# Patient Record
Sex: MALE | Race: WHITE | HISPANIC OR LATINO | Employment: FULL TIME | URBAN - METROPOLITAN AREA
[De-identification: names, ages, dates, MRNs, and addresses within clinical notes are randomized per-mention and may not be internally consistent; named-entity substitution may affect disease eponyms.]

---

## 2023-06-23 LAB
ALANINE AMINOTRANSFERASE (SGPT) (U/L) IN SER/PLAS: 41 U/L (ref 10–52)
ALBUMIN (G/DL) IN SER/PLAS: 4.5 G/DL (ref 3.4–5)
ALBUMIN (MG/L) IN URINE: <7 MG/L
ALBUMIN/CREATININE (UG/MG) IN URINE: NORMAL UG/MG CRT (ref 0–30)
ALKALINE PHOSPHATASE (U/L) IN SER/PLAS: 42 U/L (ref 33–120)
ANION GAP IN SER/PLAS: 13 MMOL/L (ref 10–20)
ASPARTATE AMINOTRANSFERASE (SGOT) (U/L) IN SER/PLAS: 29 U/L (ref 9–39)
BILIRUBIN TOTAL (MG/DL) IN SER/PLAS: 0.6 MG/DL (ref 0–1.2)
CALCIUM (MG/DL) IN SER/PLAS: 9.3 MG/DL (ref 8.6–10.3)
CARBON DIOXIDE, TOTAL (MMOL/L) IN SER/PLAS: 26 MMOL/L (ref 21–32)
CHLORIDE (MMOL/L) IN SER/PLAS: 103 MMOL/L (ref 98–107)
CHOLESTEROL (MG/DL) IN SER/PLAS: 151 MG/DL (ref 0–199)
CHOLESTEROL IN HDL (MG/DL) IN SER/PLAS: 52.6 MG/DL
CHOLESTEROL/HDL RATIO: 2.9
CREATININE (MG/DL) IN SER/PLAS: 0.95 MG/DL (ref 0.5–1.3)
CREATININE (MG/DL) IN URINE: 57.4 MG/DL (ref 20–370)
FOLLITROPIN (IU/L) IN SER/PLAS: 17.6 IU/L
GFR MALE: >90 ML/MIN/1.73M2
GLUCOSE (MG/DL) IN SER/PLAS: 165 MG/DL (ref 74–99)
LUTEINIZING HORMONE (IU/ML) IN SER/PLAS: 8.7 IU/L
NON-HDL CHOLESTEROL: 98 MG/DL
POTASSIUM (MMOL/L) IN SER/PLAS: 3.9 MMOL/L (ref 3.5–5.3)
PROTEIN TOTAL: 7 G/DL (ref 6.4–8.2)
SODIUM (MMOL/L) IN SER/PLAS: 138 MMOL/L (ref 136–145)
THYROTROPIN (MIU/L) IN SER/PLAS BY DETECTION LIMIT <= 0.05 MIU/L: 1.03 MIU/L (ref 0.44–3.98)
UREA NITROGEN (MG/DL) IN SER/PLAS: 25 MG/DL (ref 6–23)

## 2023-06-26 LAB — SEX HORMONE BINDING GLOBULIN (NMOL/L) IN SER/PLAS: 31.1 NMOL/L (ref 13.3–89.5)

## 2023-06-29 LAB
TESTOSTERONE FREE (CHAN): 69.5 PG/ML (ref 35–155)
TESTOSTERONE,TOTAL,LC-MS/MS: 411 NG/DL (ref 250–1100)

## 2023-11-07 PROBLEM — M77.40 METATARSALGIA: Status: ACTIVE | Noted: 2023-11-07

## 2023-11-07 PROBLEM — S16.1XXA CERVICAL STRAIN: Status: ACTIVE | Noted: 2023-11-07

## 2023-11-07 PROBLEM — M62.838 MUSCLE SPASMS OF NECK: Status: ACTIVE | Noted: 2023-11-07

## 2023-11-07 PROBLEM — G47.33 OBSTRUCTIVE SLEEP APNEA SYNDROME: Status: ACTIVE | Noted: 2023-11-07

## 2023-11-07 PROBLEM — F43.20 ADJUSTMENT DISORDER: Status: ACTIVE | Noted: 2023-11-07

## 2023-11-07 PROBLEM — F41.8 PERFORMANCE ANXIETY: Status: ACTIVE | Noted: 2023-11-07

## 2023-11-07 PROBLEM — R68.82 DECREASED LIBIDO: Status: ACTIVE | Noted: 2023-11-07

## 2023-11-07 PROBLEM — M99.09 SEGMENTAL AND SOMATIC DYSFUNCTION: Status: ACTIVE | Noted: 2023-11-07

## 2023-11-07 PROBLEM — J30.9 ALLERGIC RHINITIS: Status: ACTIVE | Noted: 2023-11-07

## 2023-11-07 PROBLEM — M54.9 CHRONIC BACK PAIN: Status: ACTIVE | Noted: 2023-11-07

## 2023-11-07 PROBLEM — J02.9 ACUTE PHARYNGITIS: Status: ACTIVE | Noted: 2023-11-07

## 2023-11-07 PROBLEM — G47.19 EXCESSIVE DAYTIME SLEEPINESS: Status: ACTIVE | Noted: 2023-11-07

## 2023-11-07 PROBLEM — R09.81 NASAL CONGESTION WITH RHINORRHEA: Status: ACTIVE | Noted: 2023-11-07

## 2023-11-07 PROBLEM — M77.10 LATERAL EPICONDYLITIS: Status: ACTIVE | Noted: 2023-11-07

## 2023-11-07 PROBLEM — M17.9 OSTEOARTHRITIS OF KNEE: Status: ACTIVE | Noted: 2023-11-07

## 2023-11-07 PROBLEM — F51.02 INSOMNIA, TRANSIENT: Status: ACTIVE | Noted: 2023-11-07

## 2023-11-07 PROBLEM — E11.9 DIABETES MELLITUS (MULTI): Status: ACTIVE | Noted: 2023-11-07

## 2023-11-07 PROBLEM — J34.89 NASAL CONGESTION WITH RHINORRHEA: Status: ACTIVE | Noted: 2023-11-07

## 2023-11-07 PROBLEM — N52.9 ERECTILE DYSFUNCTION: Status: ACTIVE | Noted: 2023-11-07

## 2023-11-07 PROBLEM — E55.9 VITAMIN D DEFICIENCY: Status: ACTIVE | Noted: 2023-11-07

## 2023-11-07 PROBLEM — R10.32 LLQ DISCOMFORT: Status: ACTIVE | Noted: 2023-11-07

## 2023-11-07 PROBLEM — M16.9 OSTEOARTHRITIS OF HIP: Status: ACTIVE | Noted: 2023-11-07

## 2023-11-07 PROBLEM — M22.40 CHONDROMALACIA, PATELLA: Status: ACTIVE | Noted: 2023-11-07

## 2023-11-07 PROBLEM — M76.61 ACHILLES TENDINITIS OF RIGHT LOWER EXTREMITY: Status: ACTIVE | Noted: 2023-11-07

## 2023-11-07 PROBLEM — G89.29 CHRONIC BACK PAIN: Status: ACTIVE | Noted: 2023-11-07

## 2023-11-07 RX ORDER — BLOOD-GLUCOSE METER
EACH MISCELLANEOUS 2 TIMES DAILY
COMMUNITY
Start: 2020-02-05 | End: 2024-01-10 | Stop reason: WASHOUT

## 2023-11-07 RX ORDER — TIZANIDINE 4 MG/1
1 TABLET ORAL 3 TIMES DAILY PRN
COMMUNITY
Start: 2021-07-02 | End: 2024-01-10 | Stop reason: WASHOUT

## 2023-11-07 RX ORDER — GABAPENTIN 300 MG/1
CAPSULE ORAL
COMMUNITY
Start: 2022-05-04 | End: 2024-01-10 | Stop reason: WASHOUT

## 2023-11-07 RX ORDER — PIOGLITAZONEHYDROCHLORIDE 15 MG/1
1 TABLET ORAL DAILY
COMMUNITY
Start: 2019-10-04 | End: 2024-01-10 | Stop reason: WASHOUT

## 2023-11-07 RX ORDER — EMPAGLIFLOZIN 25 MG/1
1 TABLET, FILM COATED ORAL DAILY
COMMUNITY
Start: 2019-10-04 | End: 2024-01-10 | Stop reason: WASHOUT

## 2023-11-07 RX ORDER — SAXAGLIPTIN AND METFORMIN HYDROCHLORIDE 1000; 5 MG/1; MG/1
1 TABLET, FILM COATED, EXTENDED RELEASE ORAL DAILY
COMMUNITY
Start: 2022-09-27 | End: 2024-01-10 | Stop reason: WASHOUT

## 2023-11-07 RX ORDER — DICLOFENAC SODIUM 75 MG/1
1 TABLET, DELAYED RELEASE ORAL DAILY
COMMUNITY
Start: 2022-09-27 | End: 2024-01-10 | Stop reason: WASHOUT

## 2023-11-07 RX ORDER — METHOCARBAMOL 750 MG/1
1-2 TABLET, FILM COATED ORAL 3 TIMES DAILY PRN
COMMUNITY
Start: 2022-09-27 | End: 2024-01-10 | Stop reason: WASHOUT

## 2023-11-07 RX ORDER — IPRATROPIUM BROMIDE 42 UG/1
2 SPRAY, METERED NASAL
COMMUNITY
Start: 2022-01-03 | End: 2024-01-10 | Stop reason: WASHOUT

## 2023-11-07 RX ORDER — VITAMIN E MIXED 400 UNIT
1 CAPSULE ORAL DAILY
COMMUNITY
End: 2024-01-10 | Stop reason: WASHOUT

## 2023-11-07 RX ORDER — SILDENAFIL 100 MG/1
TABLET, FILM COATED ORAL
COMMUNITY
Start: 2018-04-23 | End: 2024-01-10 | Stop reason: WASHOUT

## 2023-11-07 RX ORDER — MONTELUKAST SODIUM 10 MG/1
1 TABLET ORAL DAILY
COMMUNITY
Start: 2022-01-03 | End: 2024-01-10 | Stop reason: WASHOUT

## 2024-01-10 ENCOUNTER — OFFICE VISIT (OUTPATIENT)
Dept: PRIMARY CARE | Facility: CLINIC | Age: 49
End: 2024-01-10
Payer: COMMERCIAL

## 2024-01-10 VITALS
WEIGHT: 190 LBS | HEART RATE: 75 BPM | DIASTOLIC BLOOD PRESSURE: 60 MMHG | OXYGEN SATURATION: 95 % | SYSTOLIC BLOOD PRESSURE: 122 MMHG | BODY MASS INDEX: 27.2 KG/M2 | TEMPERATURE: 98 F | HEIGHT: 70 IN

## 2024-01-10 DIAGNOSIS — E55.9 VITAMIN D DEFICIENCY: ICD-10-CM

## 2024-01-10 DIAGNOSIS — E11.9 TYPE 2 DIABETES MELLITUS WITHOUT COMPLICATION, WITHOUT LONG-TERM CURRENT USE OF INSULIN (MULTI): Primary | ICD-10-CM

## 2024-01-10 DIAGNOSIS — J20.9 ACUTE BRONCHITIS, UNSPECIFIED ORGANISM: ICD-10-CM

## 2024-01-10 DIAGNOSIS — J30.1 SEASONAL ALLERGIC RHINITIS DUE TO POLLEN: ICD-10-CM

## 2024-01-10 DIAGNOSIS — G47.33 OBSTRUCTIVE SLEEP APNEA SYNDROME: ICD-10-CM

## 2024-01-10 DIAGNOSIS — N52.01 ERECTILE DYSFUNCTION DUE TO ARTERIAL INSUFFICIENCY: ICD-10-CM

## 2024-01-10 DIAGNOSIS — Z00.00 WELLNESS EXAMINATION: ICD-10-CM

## 2024-01-10 PROCEDURE — 1036F TOBACCO NON-USER: CPT | Performed by: FAMILY MEDICINE

## 2024-01-10 PROCEDURE — 3078F DIAST BP <80 MM HG: CPT | Performed by: FAMILY MEDICINE

## 2024-01-10 PROCEDURE — 3074F SYST BP LT 130 MM HG: CPT | Performed by: FAMILY MEDICINE

## 2024-01-10 PROCEDURE — 99214 OFFICE O/P EST MOD 30 MIN: CPT | Performed by: FAMILY MEDICINE

## 2024-01-10 RX ORDER — MULTIVITAMIN
1 TABLET ORAL DAILY
COMMUNITY
End: 2024-01-17 | Stop reason: WASHOUT

## 2024-01-10 RX ORDER — BENZONATATE 200 MG/1
200 CAPSULE ORAL 3 TIMES DAILY PRN
Qty: 90 CAPSULE | Refills: 0 | Status: SHIPPED | OUTPATIENT
Start: 2024-01-10 | End: 2024-02-09

## 2024-01-10 RX ORDER — ALBUTEROL SULFATE 90 UG/1
2 AEROSOL, METERED RESPIRATORY (INHALATION) EVERY 6 HOURS PRN
COMMUNITY
Start: 2023-12-30

## 2024-01-10 RX ORDER — BROMPHENIRAMINE MALEATE, PSEUDOEPHEDRINE HYDROCHLORIDE, AND DEXTROMETHORPHAN HYDROBROMIDE 2; 30; 10 MG/5ML; MG/5ML; MG/5ML
5 SYRUP ORAL EVERY 6 HOURS PRN
COMMUNITY
Start: 2023-12-30 | End: 2024-01-17 | Stop reason: WASHOUT

## 2024-01-10 RX ORDER — AMOXICILLIN AND CLAVULANATE POTASSIUM 875; 125 MG/1; MG/1
875 TABLET, FILM COATED ORAL 2 TIMES DAILY
Qty: 20 TABLET | Refills: 0 | Status: SHIPPED | OUTPATIENT
Start: 2024-01-10 | End: 2024-01-20

## 2024-01-10 ASSESSMENT — PATIENT HEALTH QUESTIONNAIRE - PHQ9
1. LITTLE INTEREST OR PLEASURE IN DOING THINGS: NOT AT ALL
2. FEELING DOWN, DEPRESSED OR HOPELESS: NOT AT ALL
SUM OF ALL RESPONSES TO PHQ9 QUESTIONS 1 AND 2: 0

## 2024-01-10 NOTE — PROGRESS NOTES
Patient is here for follow-up.  His endocrinologist is on maternity leave until June.  He also gets CPAP back in 2022 lost his job and is just now back on good insurance.  Recommend everything taken care of.  There is an order for CPAP machine.  He also was in the ER having bronchitis still having little bit of wheezing has not been effectively taking the inhaler as described.    REVIEW OF SYSTEMS: 12 systems negative except for those mentioned in the HPI.    PHYSICAL EXAMINATION:   Constitutional: The patient is alert, in no acute  distress.  Eyes: Extraocular movements are intact. Pupils are equal and reactive to light  ENT: Fluid turbidity behind both TMs.  Neck: Supple without lymphadenopathy or JVD.  Heart: Regular rate and rhythm without murmur, click, gallop, or rub.  Lungs: Slight expiratory wheeze in the right lung base  Psychiatric: Good judgment and insight. Normal affect and mood.  Lymphatic: as noted above.  Skin: no rashes or lesions    Assessment:  per EMR    Plan:  Patient is on antibiotic also inhaler.  Also needs full CBC CMP A1c lipid panel thyroid.  Also CPAP machine were written with his weight to see where he needs to be send follow-up in 6 months we will call with results on Monday    This dictation was created using Dragon dictation and may contain errors

## 2024-01-11 ENCOUNTER — TELEPHONE (OUTPATIENT)
Dept: PRIMARY CARE | Facility: CLINIC | Age: 49
End: 2024-01-11

## 2024-01-11 ENCOUNTER — LAB (OUTPATIENT)
Dept: LAB | Facility: LAB | Age: 49
End: 2024-01-11
Payer: COMMERCIAL

## 2024-01-11 DIAGNOSIS — J20.9 ACUTE BRONCHITIS, UNSPECIFIED ORGANISM: Primary | ICD-10-CM

## 2024-01-11 DIAGNOSIS — N52.01 ERECTILE DYSFUNCTION DUE TO ARTERIAL INSUFFICIENCY: ICD-10-CM

## 2024-01-11 DIAGNOSIS — Z00.00 WELLNESS EXAMINATION: ICD-10-CM

## 2024-01-11 DIAGNOSIS — E55.9 VITAMIN D DEFICIENCY: ICD-10-CM

## 2024-01-11 DIAGNOSIS — E11.9 TYPE 2 DIABETES MELLITUS WITHOUT COMPLICATION, WITHOUT LONG-TERM CURRENT USE OF INSULIN (MULTI): ICD-10-CM

## 2024-01-11 LAB
25(OH)D3 SERPL-MCNC: 37 NG/ML (ref 30–100)
ALBUMIN SERPL BCP-MCNC: 4 G/DL (ref 3.4–5)
ALP SERPL-CCNC: 57 U/L (ref 33–120)
ALT SERPL W P-5'-P-CCNC: 50 U/L (ref 10–52)
ANION GAP SERPL CALC-SCNC: 12 MMOL/L (ref 10–20)
AST SERPL W P-5'-P-CCNC: 26 U/L (ref 9–39)
BASOPHILS # BLD AUTO: 0.02 X10*3/UL (ref 0–0.1)
BASOPHILS NFR BLD AUTO: 0.3 %
BILIRUB SERPL-MCNC: 0.4 MG/DL (ref 0–1.2)
BUN SERPL-MCNC: 11 MG/DL (ref 6–23)
CALCIUM SERPL-MCNC: 9.3 MG/DL (ref 8.6–10.3)
CHLORIDE SERPL-SCNC: 100 MMOL/L (ref 98–107)
CHOLEST SERPL-MCNC: 139 MG/DL (ref 0–199)
CHOLESTEROL/HDL RATIO: 3.2
CO2 SERPL-SCNC: 29 MMOL/L (ref 21–32)
CREAT SERPL-MCNC: 0.8 MG/DL (ref 0.5–1.3)
CREAT UR-MCNC: 67.5 MG/DL (ref 20–370)
EGFRCR SERPLBLD CKD-EPI 2021: >90 ML/MIN/1.73M*2
EOSINOPHIL # BLD AUTO: 0.06 X10*3/UL (ref 0–0.7)
EOSINOPHIL NFR BLD AUTO: 0.9 %
ERYTHROCYTE [DISTWIDTH] IN BLOOD BY AUTOMATED COUNT: 11.6 % (ref 11.5–14.5)
EST. AVERAGE GLUCOSE BLD GHB EST-MCNC: 335 MG/DL
GLUCOSE SERPL-MCNC: 332 MG/DL (ref 74–99)
HBA1C MFR BLD: 13.3 %
HCT VFR BLD AUTO: 40.9 % (ref 41–52)
HDLC SERPL-MCNC: 42.9 MG/DL
HGB BLD-MCNC: 13.4 G/DL (ref 13.5–17.5)
IMM GRANULOCYTES # BLD AUTO: 0.03 X10*3/UL (ref 0–0.7)
IMM GRANULOCYTES NFR BLD AUTO: 0.5 % (ref 0–0.9)
LDLC SERPL CALC-MCNC: 80 MG/DL
LYMPHOCYTES # BLD AUTO: 1.36 X10*3/UL (ref 1.2–4.8)
LYMPHOCYTES NFR BLD AUTO: 21.5 %
MCH RBC QN AUTO: 30.5 PG (ref 26–34)
MCHC RBC AUTO-ENTMCNC: 32.8 G/DL (ref 32–36)
MCV RBC AUTO: 93 FL (ref 80–100)
MICROALBUMIN UR-MCNC: <7 MG/L
MICROALBUMIN/CREAT UR: NORMAL MG/G{CREAT}
MONOCYTES # BLD AUTO: 0.53 X10*3/UL (ref 0.1–1)
MONOCYTES NFR BLD AUTO: 8.4 %
NEUTROPHILS # BLD AUTO: 4.33 X10*3/UL (ref 1.2–7.7)
NEUTROPHILS NFR BLD AUTO: 68.4 %
NON HDL CHOLESTEROL: 96 MG/DL (ref 0–149)
NRBC BLD-RTO: 0 /100 WBCS (ref 0–0)
PLATELET # BLD AUTO: 462 X10*3/UL (ref 150–450)
POTASSIUM SERPL-SCNC: 4.8 MMOL/L (ref 3.5–5.3)
PROT SERPL-MCNC: 6.6 G/DL (ref 6.4–8.2)
RBC # BLD AUTO: 4.39 X10*6/UL (ref 4.5–5.9)
SODIUM SERPL-SCNC: 136 MMOL/L (ref 136–145)
TRIGL SERPL-MCNC: 82 MG/DL (ref 0–149)
TSH SERPL-ACNC: 0.36 MIU/L (ref 0.44–3.98)
VLDL: 16 MG/DL (ref 0–40)
WBC # BLD AUTO: 6.3 X10*3/UL (ref 4.4–11.3)

## 2024-01-11 PROCEDURE — 83036 HEMOGLOBIN GLYCOSYLATED A1C: CPT

## 2024-01-11 PROCEDURE — 84443 ASSAY THYROID STIM HORMONE: CPT

## 2024-01-11 PROCEDURE — 82306 VITAMIN D 25 HYDROXY: CPT

## 2024-01-11 PROCEDURE — 80053 COMPREHEN METABOLIC PANEL: CPT

## 2024-01-11 PROCEDURE — 82570 ASSAY OF URINE CREATININE: CPT

## 2024-01-11 PROCEDURE — 85025 COMPLETE CBC W/AUTO DIFF WBC: CPT

## 2024-01-11 PROCEDURE — 36415 COLL VENOUS BLD VENIPUNCTURE: CPT

## 2024-01-11 PROCEDURE — 80061 LIPID PANEL: CPT

## 2024-01-11 PROCEDURE — 82043 UR ALBUMIN QUANTITATIVE: CPT

## 2024-01-11 RX ORDER — FLUTICASONE PROPIONATE AND SALMETEROL 250; 50 UG/1; UG/1
1 POWDER RESPIRATORY (INHALATION)
Qty: 60 EACH | Refills: 11 | Status: SHIPPED | OUTPATIENT
Start: 2024-01-11 | End: 2024-01-17 | Stop reason: WASHOUT

## 2024-01-12 NOTE — TELEPHONE ENCOUNTER
----- Message from Leonel Puckett, DO sent at 1/11/2024  4:26 PM EST -----  Patient's blood sugars severely elevated at 13.3.  If he is on just a single blood sugar medication we might need to put him on insulin at night.  Would also likely be good to put him on the shiva or the Dexcom system so he has 24-hour blood sugar management.  Obviously the acute illnesses had and the fact that this is just 3 months sugar.  Will definitely want to follow-up in the next few weeks to go through all the different options

## 2024-01-17 ENCOUNTER — OFFICE VISIT (OUTPATIENT)
Dept: PRIMARY CARE | Facility: CLINIC | Age: 49
End: 2024-01-17
Payer: COMMERCIAL

## 2024-01-17 VITALS
HEART RATE: 72 BPM | TEMPERATURE: 98 F | HEIGHT: 70 IN | SYSTOLIC BLOOD PRESSURE: 100 MMHG | BODY MASS INDEX: 27.2 KG/M2 | DIASTOLIC BLOOD PRESSURE: 68 MMHG | WEIGHT: 190 LBS | OXYGEN SATURATION: 97 %

## 2024-01-17 DIAGNOSIS — G47.33 OBSTRUCTIVE SLEEP APNEA SYNDROME: ICD-10-CM

## 2024-01-17 DIAGNOSIS — E11.9 TYPE 2 DIABETES MELLITUS WITHOUT COMPLICATION, WITHOUT LONG-TERM CURRENT USE OF INSULIN (MULTI): Primary | ICD-10-CM

## 2024-01-17 DIAGNOSIS — E55.9 VITAMIN D DEFICIENCY: ICD-10-CM

## 2024-01-17 DIAGNOSIS — N52.01 ERECTILE DYSFUNCTION DUE TO ARTERIAL INSUFFICIENCY: ICD-10-CM

## 2024-01-17 PROCEDURE — 99214 OFFICE O/P EST MOD 30 MIN: CPT | Performed by: FAMILY MEDICINE

## 2024-01-17 PROCEDURE — 3062F POS MACROALBUMINURIA REV: CPT | Performed by: FAMILY MEDICINE

## 2024-01-17 PROCEDURE — 3078F DIAST BP <80 MM HG: CPT | Performed by: FAMILY MEDICINE

## 2024-01-17 PROCEDURE — 3074F SYST BP LT 130 MM HG: CPT | Performed by: FAMILY MEDICINE

## 2024-01-17 PROCEDURE — 1036F TOBACCO NON-USER: CPT | Performed by: FAMILY MEDICINE

## 2024-01-17 PROCEDURE — 3046F HEMOGLOBIN A1C LEVEL >9.0%: CPT | Performed by: FAMILY MEDICINE

## 2024-01-17 PROCEDURE — 3048F LDL-C <100 MG/DL: CPT | Performed by: FAMILY MEDICINE

## 2024-01-17 RX ORDER — MELATONIN 10 MG
TABLET, SUBLINGUAL SUBLINGUAL
COMMUNITY
Start: 2023-06-28

## 2024-01-17 RX ORDER — GLIPIZIDE 10 MG/1
10 TABLET ORAL
Qty: 90 TABLET | Refills: 1 | Status: SHIPPED | OUTPATIENT
Start: 2024-01-17 | End: 2024-04-03 | Stop reason: SDUPTHER

## 2024-01-17 RX ORDER — ACETAMINOPHEN 500 MG
TABLET ORAL EVERY 6 HOURS PRN
COMMUNITY

## 2024-01-17 RX ORDER — TIRZEPATIDE 2.5 MG/.5ML
2.5 INJECTION, SOLUTION SUBCUTANEOUS
Qty: 2 ML | Refills: 0 | Status: SHIPPED | OUTPATIENT
Start: 2024-01-17 | End: 2024-04-03 | Stop reason: WASHOUT

## 2024-01-17 RX ORDER — CALCIUM CARBONATE 600 MG
TABLET ORAL
COMMUNITY
Start: 2023-06-28

## 2024-01-17 ASSESSMENT — PATIENT HEALTH QUESTIONNAIRE - PHQ9
SUM OF ALL RESPONSES TO PHQ9 QUESTIONS 1 AND 2: 0
2. FEELING DOWN, DEPRESSED OR HOPELESS: NOT AT ALL
1. LITTLE INTEREST OR PLEASURE IN DOING THINGS: NOT AT ALL

## 2024-01-17 NOTE — PROGRESS NOTES
Patient is here for follow-up of severe sleep apnea for CPAP machine.  He did talk to his insurance and does not need the Prevnar as needed.  We already have those prepared with the last note, the order and the titration study.  His A1c was 13.2.  He has been having a problem with his sugar.    REVIEW OF SYSTEMS: 12 systems negative except for those mentioned in the HPI.    PHYSICAL EXAMINATION:   Constitutional: The patient is alert, in no acute  distress.  Eyes: Extraocular movements are intact.   ENT: external ear canals patent  Neck: no  JVD.  Heart: no JVD  Lungs: Normal respiration without stridor or nasal flaring   Psychiatric: Good judgment and insight. Normal affect and mood.  Skin: no rashes or lesions    Assessment:  per EMR    Plan:  Patient is improved with the sinus infection and still on the Augmentin.  I discussed increasing the glipizide as well as also Mounjaro.  Also went through the patient diet since he is [place] he happily eats malanga which is actually very low on the glycemic index as it is very high in fiber and similar to internal.  I discussed other things that were not good including fruit juices that the patient was routinely eating.  Patient will call back in 3 weeks and will adjust Trulicity dose or Mounjaro whichever 1 is covered every 3 weeks until we have good titration.  Patient declines the shiva or Dexcom machine will follow-up in 3 months for A1c.  Will continue out the Augmentin and also wrote the order for the home CPAP machine for which she can get and start using.  He was also set up for endocrinology but that is not until June    This dictation was created using Dragon dictation and may contain errors

## 2024-04-02 PROBLEM — R05.9 COUGH: Status: ACTIVE | Noted: 2018-01-08

## 2024-04-02 PROBLEM — R40.0 DAYTIME SOMNOLENCE: Status: ACTIVE | Noted: 2023-11-07

## 2024-04-02 PROBLEM — R21 RASH: Status: ACTIVE | Noted: 2024-04-02

## 2024-04-02 PROBLEM — M76.60 ACHILLES TENDINITIS: Status: ACTIVE | Noted: 2023-11-07

## 2024-04-02 PROBLEM — J30.1 ALLERGIC RHINITIS DUE TO POLLEN: Status: ACTIVE | Noted: 2024-04-02

## 2024-04-02 PROBLEM — R07.81 RIB PAIN: Status: ACTIVE | Noted: 2024-04-02

## 2024-04-02 PROBLEM — N52.01 ERECTILE DYSFUNCTION DUE TO ARTERIAL INSUFFICIENCY: Status: ACTIVE | Noted: 2024-04-02

## 2024-04-03 ENCOUNTER — OFFICE VISIT (OUTPATIENT)
Dept: PRIMARY CARE | Facility: CLINIC | Age: 49
End: 2024-04-03
Payer: COMMERCIAL

## 2024-04-03 VITALS
HEIGHT: 70 IN | BODY MASS INDEX: 27.35 KG/M2 | WEIGHT: 191 LBS | TEMPERATURE: 97.1 F | OXYGEN SATURATION: 97 % | HEART RATE: 75 BPM | DIASTOLIC BLOOD PRESSURE: 70 MMHG | SYSTOLIC BLOOD PRESSURE: 118 MMHG

## 2024-04-03 DIAGNOSIS — G47.33 OBSTRUCTIVE SLEEP APNEA SYNDROME: ICD-10-CM

## 2024-04-03 DIAGNOSIS — E11.9 TYPE 2 DIABETES MELLITUS WITHOUT COMPLICATION, WITHOUT LONG-TERM CURRENT USE OF INSULIN (MULTI): Primary | ICD-10-CM

## 2024-04-03 DIAGNOSIS — J30.1 SEASONAL ALLERGIC RHINITIS DUE TO POLLEN: ICD-10-CM

## 2024-04-03 DIAGNOSIS — N52.01 ERECTILE DYSFUNCTION DUE TO ARTERIAL INSUFFICIENCY: ICD-10-CM

## 2024-04-03 DIAGNOSIS — E55.9 VITAMIN D DEFICIENCY: ICD-10-CM

## 2024-04-03 PROBLEM — N52.9 ERECTILE DYSFUNCTION: Status: RESOLVED | Noted: 2023-11-07 | Resolved: 2024-04-03

## 2024-04-03 LAB — POC HEMOGLOBIN A1C: 8.7 % (ref 4.2–6.5)

## 2024-04-03 PROCEDURE — 3062F POS MACROALBUMINURIA REV: CPT | Performed by: FAMILY MEDICINE

## 2024-04-03 PROCEDURE — 3046F HEMOGLOBIN A1C LEVEL >9.0%: CPT | Performed by: FAMILY MEDICINE

## 2024-04-03 PROCEDURE — 3074F SYST BP LT 130 MM HG: CPT | Performed by: FAMILY MEDICINE

## 2024-04-03 PROCEDURE — 99213 OFFICE O/P EST LOW 20 MIN: CPT | Performed by: FAMILY MEDICINE

## 2024-04-03 PROCEDURE — 1036F TOBACCO NON-USER: CPT | Performed by: FAMILY MEDICINE

## 2024-04-03 PROCEDURE — 3078F DIAST BP <80 MM HG: CPT | Performed by: FAMILY MEDICINE

## 2024-04-03 PROCEDURE — 83036 HEMOGLOBIN GLYCOSYLATED A1C: CPT | Performed by: FAMILY MEDICINE

## 2024-04-03 PROCEDURE — 3048F LDL-C <100 MG/DL: CPT | Performed by: FAMILY MEDICINE

## 2024-04-03 RX ORDER — DULAGLUTIDE 0.75 MG/.5ML
0.75 INJECTION, SOLUTION SUBCUTANEOUS
Qty: 4 EACH | Refills: 1 | Status: SHIPPED | OUTPATIENT
Start: 2024-04-03

## 2024-04-03 RX ORDER — BENZONATATE 200 MG/1
200 CAPSULE ORAL 3 TIMES DAILY PRN
COMMUNITY

## 2024-04-03 RX ORDER — GLIPIZIDE 10 MG/1
10 TABLET ORAL
Qty: 90 TABLET | Refills: 1 | Status: SHIPPED | OUTPATIENT
Start: 2024-04-03 | End: 2024-09-30

## 2024-04-03 ASSESSMENT — PATIENT HEALTH QUESTIONNAIRE - PHQ9
2. FEELING DOWN, DEPRESSED OR HOPELESS: NOT AT ALL
1. LITTLE INTEREST OR PLEASURE IN DOING THINGS: NOT AT ALL
SUM OF ALL RESPONSES TO PHQ9 QUESTIONS 1 AND 2: 0

## 2024-04-03 NOTE — PROGRESS NOTES
Patient here for 3-month A1c check.  Is substantially improved from 13 down to 8.7.  Was not able to get the Mounjaro is not having any side effects or issues does have follow-up with endocrinology in June.    REVIEW OF SYSTEMS: 12 systems negative except for those mentioned in the HPI.    PHYSICAL EXAMINATION:   Constitutional: The patient is alert, in no acute  distress.  Eyes: Extraocular movements are intact.   ENT: external ear canals patent  Neck: no  JVD.  Heart: no JVD  Lungs: Normal respiration without stridor or nasal flaring   Psychiatric: Good judgment and insight. Normal affect and mood.  Skin: no rashes or lesions    Assessment:  per EMR    Plan:  Long discussion with the patient really would like him underneath 7 especially at his age preferably closer to 6 discussed we may have to use basal insulin to get there.  He really does not want to do that and has not been running.  Begrudgingly, the patient does except 1 month worth of Dexcom and hopefully this will also help the patient tracking and see how his diet is specifically relating.  He does follow-up with endocrinology in 3 months and if his A1c is not around 7 or below she consider insulin therapy at that time which she would be okay with.  Will refill his medications currently his blood pressure is excellent    This dictation was created using Dragon dictation and may contain errors

## 2024-06-19 ENCOUNTER — APPOINTMENT (OUTPATIENT)
Dept: ENDOCRINOLOGY | Facility: CLINIC | Age: 49
End: 2024-06-19
Payer: COMMERCIAL

## 2024-06-19 VITALS
BODY MASS INDEX: 25.06 KG/M2 | WEIGHT: 185 LBS | SYSTOLIC BLOOD PRESSURE: 126 MMHG | HEIGHT: 72 IN | DIASTOLIC BLOOD PRESSURE: 71 MMHG

## 2024-06-19 DIAGNOSIS — E11.00 TYPE 2 DIABETES MELLITUS WITH HYPEROSMOLARITY WITHOUT COMA, WITHOUT LONG-TERM CURRENT USE OF INSULIN (MULTI): Primary | ICD-10-CM

## 2024-06-19 DIAGNOSIS — E78.5 DYSLIPIDEMIA: ICD-10-CM

## 2024-06-19 LAB — POC HEMOGLOBIN A1C: 9.7 % (ref 4.2–6.5)

## 2024-06-19 PROCEDURE — 83036 HEMOGLOBIN GLYCOSYLATED A1C: CPT | Performed by: HOSPITALIST

## 2024-06-19 PROCEDURE — 3046F HEMOGLOBIN A1C LEVEL >9.0%: CPT | Performed by: HOSPITALIST

## 2024-06-19 PROCEDURE — 3078F DIAST BP <80 MM HG: CPT | Performed by: HOSPITALIST

## 2024-06-19 PROCEDURE — 3048F LDL-C <100 MG/DL: CPT | Performed by: HOSPITALIST

## 2024-06-19 PROCEDURE — 3062F POS MACROALBUMINURIA REV: CPT | Performed by: HOSPITALIST

## 2024-06-19 PROCEDURE — 3074F SYST BP LT 130 MM HG: CPT | Performed by: HOSPITALIST

## 2024-06-19 PROCEDURE — 99214 OFFICE O/P EST MOD 30 MIN: CPT | Performed by: HOSPITALIST

## 2024-06-19 ASSESSMENT — ENCOUNTER SYMPTOMS
NERVOUS/ANXIOUS: 0
SLEEP DISTURBANCE: 0
NAUSEA: 0
VOMITING: 0
EYE ITCHING: 0
LIGHT-HEADEDNESS: 0
HEADACHES: 0
DIARRHEA: 0
ABDOMINAL PAIN: 0
ABDOMINAL DISTENTION: 0
VOICE CHANGE: 0
PALPITATIONS: 0
FREQUENCY: 0
TREMORS: 0
TROUBLE SWALLOWING: 0
CONSTITUTIONAL NEGATIVE: 1
ARTHRALGIAS: 0
CONSTIPATION: 0
SORE THROAT: 0
DYSURIA: 0
CHEST TIGHTNESS: 0
AGITATION: 0
PHOTOPHOBIA: 0
SHORTNESS OF BREATH: 0

## 2024-06-19 NOTE — PATIENT INSTRUCTIONS
If you start jardiance , stop glipizide and do blood work in 3 weeks    Continue janumet same dose

## 2024-06-19 NOTE — PROGRESS NOTES
Subjective   Patient ID: Jose Reyes is a 49 y.o. male who presents for Diabetes (PCP Dr Puckett, last visit 4/3/24/DX DM II NO CGM/sometimes checks BS no meter today/FAM HX: Parents DM II).  Diabetes  Pertinent negatives for hypoglycemia include no headaches, nervousness/anxiousness, pallor or tremors. Pertinent negatives for diabetes include no chest pain and no polyuria.      See AP     Review of Systems   Constitutional: Negative.    HENT:  Negative for sore throat, trouble swallowing and voice change.    Eyes:  Negative for photophobia, itching and visual disturbance.   Respiratory:  Negative for chest tightness and shortness of breath.    Cardiovascular:  Negative for chest pain and palpitations.   Gastrointestinal:  Negative for abdominal distention, abdominal pain, constipation, diarrhea, nausea and vomiting.   Endocrine: Negative for cold intolerance, heat intolerance and polyuria.   Genitourinary:  Negative for dysuria and frequency.   Musculoskeletal:  Negative for arthralgias.   Skin:  Negative for pallor.   Allergic/Immunologic: Negative for environmental allergies.   Neurological:  Negative for tremors, light-headedness and headaches.   Psychiatric/Behavioral:  Negative for agitation and sleep disturbance. The patient is not nervous/anxious.        Objective   Physical Exam  Constitutional:       Appearance: Normal appearance.   HENT:      Head: Normocephalic.      Nose: Nose normal.      Mouth/Throat:      Mouth: Mucous membranes are moist.   Eyes:      Extraocular Movements: Extraocular movements intact.   Cardiovascular:      Rate and Rhythm: Normal rate.   Pulmonary:      Effort: Pulmonary effort is normal. No respiratory distress.   Abdominal:      General: There is no distension.   Musculoskeletal:         General: Normal range of motion.      Cervical back: Normal range of motion and neck supple.   Skin:     General: Skin is warm and dry.   Neurological:      Mental Status: He is alert and oriented  to person, place, and time.   Psychiatric:         Mood and Affect: Mood normal.         Assessment/Plan   Diagnoses and all orders for this visit:  Type 2 diabetes mellitus with hyperosmolarity without coma, without long-term current use of insulin (Multi)  -     POCT glycosylated hemoglobin (Hb A1C) manually resulted  -     Comprehensive metabolic panel; Future  -     Albumin , Urine (SPOT); Future  -     empagliflozin (Jardiance) 10 mg; Take 1 tablet (10 mg) by mouth once daily.  -     Hemoglobin A1c; Future  -     Comprehensive metabolic panel; Future  -     Lipid Panel; Future  Dyslipidemia       Type 2 DM, uncontrolled , A1c worse.      Current regimen:   Janumet 50-1000mg BID   Glipizide 10 mg in evening     Did not get trulicity as it was costly   Unsure why jardiance was discontinue , denies any SE was on it in past ?      Decreased redbull a lot , only drinks sparkling mineral water       A1c was ~ 13 % changed his diet then A1c improved to 8.7 %    Right now eating more meat only , avoid any carbs        PLAN     - switch glipizide to jardaince 10 mg    Discussed SE including Uti/ yeast infections/ dizziness / EDKA    If jardiance not covered then continue same glipizide    RFP in 3 weeks , increase oral hydration   - continue janumet   - avoid simple carbs   STOP JUICE AND REDBULL    Check BG twice a day. Bring meter [place]   Discussed diabetic diet and diet modifications as a means to control diabetes  LDL around goal , not on statin   BP at goal      ED: mentions has struggled for couple of years   does have REUBEN   advised continue CPAP   testosterone levels nml in past    Did not discuss during this visit      RTC 4m.      SH - he is from [place] originally    he works as a    he does drink alcohol when he visits [place]    2 dogs - jeri.   darrin and chespirato      FH- mom dad type 2 DM .     Kris Alarcon MD 06/19/24 3:57 PM

## 2024-10-09 ENCOUNTER — APPOINTMENT (OUTPATIENT)
Dept: PRIMARY CARE | Facility: CLINIC | Age: 49
End: 2024-10-09
Payer: COMMERCIAL

## 2024-10-09 VITALS
BODY MASS INDEX: 25.33 KG/M2 | SYSTOLIC BLOOD PRESSURE: 104 MMHG | TEMPERATURE: 97.8 F | DIASTOLIC BLOOD PRESSURE: 70 MMHG | HEIGHT: 72 IN | HEART RATE: 74 BPM | WEIGHT: 187 LBS

## 2024-10-09 DIAGNOSIS — E66.3 OVERWEIGHT (BMI 25.0-29.9): ICD-10-CM

## 2024-10-09 DIAGNOSIS — E11.9 TYPE 2 DIABETES MELLITUS WITHOUT COMPLICATION, WITHOUT LONG-TERM CURRENT USE OF INSULIN (MULTI): Primary | ICD-10-CM

## 2024-10-09 DIAGNOSIS — E55.9 VITAMIN D DEFICIENCY: ICD-10-CM

## 2024-10-09 DIAGNOSIS — M77.11 LATERAL EPICONDYLITIS OF RIGHT ELBOW: ICD-10-CM

## 2024-10-09 DIAGNOSIS — N52.01 ERECTILE DYSFUNCTION DUE TO ARTERIAL INSUFFICIENCY: ICD-10-CM

## 2024-10-09 DIAGNOSIS — G47.33 OBSTRUCTIVE SLEEP APNEA SYNDROME: ICD-10-CM

## 2024-10-09 DIAGNOSIS — M25.511 PAIN IN JOINT OF RIGHT SHOULDER: ICD-10-CM

## 2024-10-09 PROCEDURE — 3078F DIAST BP <80 MM HG: CPT | Performed by: FAMILY MEDICINE

## 2024-10-09 PROCEDURE — 1036F TOBACCO NON-USER: CPT | Performed by: FAMILY MEDICINE

## 2024-10-09 PROCEDURE — 3048F LDL-C <100 MG/DL: CPT | Performed by: FAMILY MEDICINE

## 2024-10-09 PROCEDURE — 3046F HEMOGLOBIN A1C LEVEL >9.0%: CPT | Performed by: FAMILY MEDICINE

## 2024-10-09 PROCEDURE — 3008F BODY MASS INDEX DOCD: CPT | Performed by: FAMILY MEDICINE

## 2024-10-09 PROCEDURE — 3074F SYST BP LT 130 MM HG: CPT | Performed by: FAMILY MEDICINE

## 2024-10-09 PROCEDURE — 99213 OFFICE O/P EST LOW 20 MIN: CPT | Performed by: FAMILY MEDICINE

## 2024-10-09 PROCEDURE — 3062F POS MACROALBUMINURIA REV: CPT | Performed by: FAMILY MEDICINE

## 2024-10-09 RX ORDER — DICLOFENAC SODIUM 10 MG/G
4 GEL TOPICAL 4 TIMES DAILY
Qty: 100 G | Refills: 1 | Status: SHIPPED | OUTPATIENT
Start: 2024-10-09

## 2024-10-09 NOTE — PROGRESS NOTES
6 month fuv   Right elbow issues, can't lift anything heavy   Right shoulder issues when sleeping     
Patient is here 6-month follow-up.  Patient has had endocrinology take over the A1c.  Try to work on diet.  His main issue is been some discomfort in his right shoulder when he rolls over at that time as well as and also his right elbow has been picking up a lot of bricks.    REVIEW OF SYSTEMS: 12 systems negative except for those mentioned in the HPI.    PHYSICAL EXAMINATION:   Constitutional: The patient is alert, in no acute  distress.  Eyes: Extraocular movements are intact.   ENT: external ear canals patent  Neck: no  JVD.  Heart: no JVD  Lungs: Normal respiration without stridor or nasal flaring   Psychiatric: Good judgment and insight. Normal affect and mood.  Skin: no rashes or lesions  Msk/neuro: pain at lateral epi on the right and spasm in rhomboids in the right shoulder.     Assessment:  per EMR    Plan:  Discussed voltaren gel, tennis elbow band, therapy and reviewed dm2, worsening. Working on diet and exercise. Followup 6 months.    This dictation was created using Dragon dictation and may contain errors  
26-Feb-2018 03:32

## 2024-10-14 ENCOUNTER — LAB (OUTPATIENT)
Dept: LAB | Facility: LAB | Age: 49
End: 2024-10-14
Payer: COMMERCIAL

## 2024-10-14 DIAGNOSIS — E11.00 TYPE 2 DIABETES MELLITUS WITH HYPEROSMOLARITY WITHOUT COMA, WITHOUT LONG-TERM CURRENT USE OF INSULIN (MULTI): ICD-10-CM

## 2024-10-14 LAB
ALBUMIN SERPL BCP-MCNC: 4.5 G/DL (ref 3.4–5)
ALP SERPL-CCNC: 54 U/L (ref 33–120)
ALT SERPL W P-5'-P-CCNC: 33 U/L (ref 10–52)
ANION GAP SERPL CALC-SCNC: 13 MMOL/L (ref 10–20)
AST SERPL W P-5'-P-CCNC: 26 U/L (ref 9–39)
BILIRUB SERPL-MCNC: 0.7 MG/DL (ref 0–1.2)
BUN SERPL-MCNC: 20 MG/DL (ref 6–23)
CALCIUM SERPL-MCNC: 10 MG/DL (ref 8.6–10.6)
CHLORIDE SERPL-SCNC: 99 MMOL/L (ref 98–107)
CHOLEST SERPL-MCNC: 172 MG/DL (ref 0–199)
CHOLESTEROL/HDL RATIO: 3
CO2 SERPL-SCNC: 29 MMOL/L (ref 21–32)
CREAT SERPL-MCNC: 0.88 MG/DL (ref 0.5–1.3)
CREAT UR-MCNC: 81.4 MG/DL (ref 20–370)
EGFRCR SERPLBLD CKD-EPI 2021: >90 ML/MIN/1.73M*2
EST. AVERAGE GLUCOSE BLD GHB EST-MCNC: 255 MG/DL
GLUCOSE SERPL-MCNC: 264 MG/DL (ref 74–99)
HBA1C MFR BLD: 10.5 %
HDLC SERPL-MCNC: 56.4 MG/DL
LDLC SERPL CALC-MCNC: 96 MG/DL
MICROALBUMIN UR-MCNC: <7 MG/L
MICROALBUMIN/CREAT UR: NORMAL MG/G{CREAT}
NON HDL CHOLESTEROL: 116 MG/DL (ref 0–149)
POTASSIUM SERPL-SCNC: 4.5 MMOL/L (ref 3.5–5.3)
PROT SERPL-MCNC: 6.6 G/DL (ref 6.4–8.2)
SODIUM SERPL-SCNC: 136 MMOL/L (ref 136–145)
TRIGL SERPL-MCNC: 96 MG/DL (ref 0–149)
VLDL: 19 MG/DL (ref 0–40)

## 2024-10-14 PROCEDURE — 83036 HEMOGLOBIN GLYCOSYLATED A1C: CPT

## 2024-10-14 PROCEDURE — 36415 COLL VENOUS BLD VENIPUNCTURE: CPT

## 2024-10-14 PROCEDURE — 82570 ASSAY OF URINE CREATININE: CPT

## 2024-10-14 PROCEDURE — 82043 UR ALBUMIN QUANTITATIVE: CPT

## 2024-10-14 PROCEDURE — 80061 LIPID PANEL: CPT

## 2024-10-14 PROCEDURE — 80053 COMPREHEN METABOLIC PANEL: CPT

## 2024-10-18 ENCOUNTER — APPOINTMENT (OUTPATIENT)
Dept: ENDOCRINOLOGY | Facility: CLINIC | Age: 49
End: 2024-10-18
Payer: COMMERCIAL

## 2024-10-18 DIAGNOSIS — E66.3 OVERWEIGHT (BMI 25.0-29.9): ICD-10-CM

## 2024-10-18 DIAGNOSIS — E11.00 TYPE 2 DIABETES MELLITUS WITH HYPEROSMOLARITY WITHOUT COMA, WITHOUT LONG-TERM CURRENT USE OF INSULIN (MULTI): ICD-10-CM

## 2024-10-18 DIAGNOSIS — E11.9 TYPE 2 DIABETES MELLITUS WITHOUT COMPLICATION, WITHOUT LONG-TERM CURRENT USE OF INSULIN (MULTI): Primary | ICD-10-CM

## 2024-10-18 PROCEDURE — 99214 OFFICE O/P EST MOD 30 MIN: CPT | Performed by: HOSPITALIST

## 2024-10-18 PROCEDURE — 3062F POS MACROALBUMINURIA REV: CPT | Performed by: HOSPITALIST

## 2024-10-18 PROCEDURE — 3046F HEMOGLOBIN A1C LEVEL >9.0%: CPT | Performed by: HOSPITALIST

## 2024-10-18 PROCEDURE — 3048F LDL-C <100 MG/DL: CPT | Performed by: HOSPITALIST

## 2024-10-18 ASSESSMENT — ENCOUNTER SYMPTOMS
TREMORS: 0
VOMITING: 0
VOICE CHANGE: 0
NERVOUS/ANXIOUS: 0
ARTHRALGIAS: 0
CHEST TIGHTNESS: 0
PALPITATIONS: 0
SORE THROAT: 0
LIGHT-HEADEDNESS: 0
NAUSEA: 0
CONSTIPATION: 0
HEADACHES: 0
DYSURIA: 0
CONSTITUTIONAL NEGATIVE: 1
ABDOMINAL DISTENTION: 0
TROUBLE SWALLOWING: 0
AGITATION: 0
ABDOMINAL PAIN: 0
SHORTNESS OF BREATH: 0
SLEEP DISTURBANCE: 0
FREQUENCY: 0
PHOTOPHOBIA: 0
EYE ITCHING: 0
DIARRHEA: 0

## 2024-10-18 NOTE — PROGRESS NOTES
Subjective   Patient ID: Jose Reyes is a 49 y.o. male who presents for Diabetes (VIRTUAL VISIT::: /DM2 /[facility] DM: Parents DM2/PCP: [facility] /Pt testing glucose randomly ).  Lab Results   Component Value Date    HGBA1C 10.5 (H) 10/14/2024      HPI   See AP     Review of Systems   Constitutional: Negative.    HENT:  Negative for sore throat, trouble swallowing and voice change.    Eyes:  Negative for photophobia, itching and visual disturbance.   Respiratory:  Negative for chest tightness and shortness of breath.    Cardiovascular:  Negative for chest pain and palpitations.   Gastrointestinal:  Negative for abdominal distention, abdominal pain, constipation, diarrhea, nausea and vomiting.   Endocrine: Negative for cold intolerance, heat intolerance and polyuria.   Genitourinary:  Negative for dysuria and frequency.   Musculoskeletal:  Negative for arthralgias.   Skin:  Negative for pallor.   Allergic/Immunologic: Negative for environmental allergies.   Neurological:  Negative for tremors, light-headedness and headaches.   Psychiatric/Behavioral:  Negative for agitation and sleep disturbance. The patient is not nervous/anxious.        Objective   Physical Exam No vitals due to virtual visit   NAD    AAOx3   EOM nml   MMM     Assessment/Plan   Diagnoses and all orders for this visit:  Type 2 diabetes mellitus without complication, without long-term current use of insulin (Multi)  -     SITagliptin phos-metformin 50-1,000 mg tablet, ER multiphase 24 hr; Take 1 tablet by mouth 2 times a day.  -     semaglutide (Rybelsus) 3 mg tablet; Take 1 tablet (3 mg) by mouth once daily.  Type 2 diabetes mellitus with hyperosmolarity without coma, without long-term current use of insulin (Multi)  -     empagliflozin (Jardiance) 10 mg; Take 1 tablet (10 mg) by mouth once daily.  Overweight (BMI 25.0-29.9)         Type 2 DM, uncontrolled , A1c worse.      Current regimen:   Janumet 50-1000mg BID   Jardaince 10 mg once daily ( started 6/ 2024  )     Did not get trulicity as it was costly   Unsure why jardiance was discontinue , denies any SE was on it in past ?      Decreased redbull a lot , only drinks sparkling mineral water       A1c was ~ 13 % changed his diet then A1c improved to 8.7 % now again 10.5 %    Right now eating more meat only , avoid any carbs        PLAN   - discussed rybelsus.   - Discussed MOA SE including pancreatitis and cholecystitis   - continue janumet and jardiance   - increase oral hydration    Discussed SE including Uti/ yeast infections/ dizziness / EDKA   - avoid simple carbs   STOP JUICE AND REDBULL    Check BG twice a day. Bring meter [place]   Discussed diabetic diet and diet modifications as a means to control diabetes  LDL around goal , not on statin   Discussed DM complications      ED: In past  mentioned has struggled for couple of years   does have REUBEN   Advised to continue CPAP.   testosterone levels nml in past.    Did not discuss during this visit        It was a  virtual visit and pt consented for it   RTC 4m.      SH - he is from [place] originally    he works as a    he does drink alcohol when he visits [place]    2 dogs - crispin clifford and mikhail      FH- mom dad type 2 DM .

## 2024-10-31 DIAGNOSIS — E11.9 TYPE 2 DIABETES MELLITUS WITHOUT COMPLICATION, WITHOUT LONG-TERM CURRENT USE OF INSULIN (MULTI): Primary | ICD-10-CM

## 2025-02-20 ENCOUNTER — APPOINTMENT (OUTPATIENT)
Dept: ENDOCRINOLOGY | Facility: CLINIC | Age: 50
End: 2025-02-20
Payer: COMMERCIAL

## 2025-02-20 VITALS
WEIGHT: 198 LBS | DIASTOLIC BLOOD PRESSURE: 60 MMHG | HEIGHT: 72 IN | SYSTOLIC BLOOD PRESSURE: 104 MMHG | BODY MASS INDEX: 26.82 KG/M2

## 2025-02-20 DIAGNOSIS — E11.9 TYPE 2 DIABETES MELLITUS WITHOUT COMPLICATION, WITHOUT LONG-TERM CURRENT USE OF INSULIN (MULTI): Primary | ICD-10-CM

## 2025-02-20 DIAGNOSIS — E66.3 OVERWEIGHT (BMI 25.0-29.9): ICD-10-CM

## 2025-02-20 LAB
POC FINGERSTICK BLOOD GLUCOSE: 318 MG/DL (ref 70–100)
POC HEMOGLOBIN A1C: 10.5 % (ref 4.2–6.5)

## 2025-02-20 PROCEDURE — 99214 OFFICE O/P EST MOD 30 MIN: CPT | Performed by: HOSPITALIST

## 2025-02-20 PROCEDURE — 3074F SYST BP LT 130 MM HG: CPT | Performed by: HOSPITALIST

## 2025-02-20 PROCEDURE — 3078F DIAST BP <80 MM HG: CPT | Performed by: HOSPITALIST

## 2025-02-20 PROCEDURE — 83036 HEMOGLOBIN GLYCOSYLATED A1C: CPT | Performed by: HOSPITALIST

## 2025-02-20 PROCEDURE — 3008F BODY MASS INDEX DOCD: CPT | Performed by: HOSPITALIST

## 2025-02-20 PROCEDURE — 82962 GLUCOSE BLOOD TEST: CPT | Performed by: HOSPITALIST

## 2025-02-20 RX ORDER — METFORMIN HYDROCHLORIDE 500 MG/1
TABLET, EXTENDED RELEASE ORAL
Qty: 180 TABLET | Refills: 3 | Status: SHIPPED | OUTPATIENT
Start: 2025-02-20

## 2025-02-20 RX ORDER — GLIPIZIDE 10 MG/1
10 TABLET ORAL
Start: 2025-02-20 | End: 2026-02-20

## 2025-02-20 RX ORDER — METFORMIN HYDROCHLORIDE 500 MG/1
TABLET, EXTENDED RELEASE ORAL
Qty: 180 TABLET | Refills: 3 | Status: SHIPPED | OUTPATIENT
Start: 2025-02-20 | End: 2025-02-20

## 2025-02-20 RX ORDER — BEXAGLIFLOZIN 20 MG
20 TABLET ORAL DAILY
Qty: 90 TABLET | Refills: 1 | Status: SHIPPED | OUTPATIENT
Start: 2025-02-20

## 2025-02-20 ASSESSMENT — ENCOUNTER SYMPTOMS
PALPITATIONS: 0
DIARRHEA: 0
ABDOMINAL PAIN: 0
AGITATION: 0
DYSURIA: 0
PHOTOPHOBIA: 0
ARTHRALGIAS: 0
NAUSEA: 0
TREMORS: 0
TROUBLE SWALLOWING: 0
CONSTIPATION: 0
CHEST TIGHTNESS: 0
SORE THROAT: 0
LIGHT-HEADEDNESS: 0
FREQUENCY: 0
VOMITING: 0
NERVOUS/ANXIOUS: 0
SLEEP DISTURBANCE: 0
EYE ITCHING: 0
ABDOMINAL DISTENTION: 0
VOICE CHANGE: 0
SHORTNESS OF BREATH: 0
CONSTITUTIONAL NEGATIVE: 1
HEADACHES: 0

## 2025-02-20 NOTE — PROGRESS NOTES
Subjective   Patient ID: Jose Reyes is a 49 y.o. male who presents for Diabetes ( PCP Dr Puckett, /DX DM II NO CGM/sometimes checks BS no meter today/FAM HX: Parents DM II). No Insurance  Out of medicine).   Lab Results   Component Value Date    HGBA1C 10.5 (A) 02/20/2025      HPI   See AP     Review of Systems   Constitutional: Negative.    HENT:  Negative for sore throat, trouble swallowing and voice change.    Eyes:  Negative for photophobia, itching and visual disturbance.   Respiratory:  Negative for chest tightness and shortness of breath.    Cardiovascular:  Negative for chest pain and palpitations.   Gastrointestinal:  Negative for abdominal distention, abdominal pain, constipation, diarrhea, nausea and vomiting.   Endocrine: Negative for cold intolerance, heat intolerance and polyuria.   Genitourinary:  Negative for dysuria and frequency.   Musculoskeletal:  Negative for arthralgias.   Skin:  Negative for pallor.   Allergic/Immunologic: Negative for environmental allergies.   Neurological:  Negative for tremors, light-headedness and headaches.   Psychiatric/Behavioral:  Negative for agitation and sleep disturbance. The patient is not nervous/anxious.        Objective   Physical Exam  Constitutional:       Appearance: Normal appearance.   HENT:      Head: Normocephalic.      Nose: Nose normal.      Mouth/Throat:      Mouth: Mucous membranes are moist.   Eyes:      Extraocular Movements: Extraocular movements intact.   Cardiovascular:      Rate and Rhythm: Normal rate.   Pulmonary:      Effort: Pulmonary effort is normal. No respiratory distress.   Abdominal:      General: There is no distension.   Musculoskeletal:         General: Normal range of motion.      Cervical back: Normal range of motion and neck supple.   Skin:     General: Skin is warm and dry.   Neurological:      Mental Status: He is alert and oriented to person, place, and time.   Psychiatric:         Mood and Affect: Mood normal.      Visit  Vitals  Smoking Status Never        Assessment/Plan   Diagnoses and all orders for this visit:  Type 2 diabetes mellitus without complication, without long-term current use of insulin (Multi)  -     POCT glycosylated hemoglobin (Hb A1C) manually resulted  -     POCT fingerstick glucose manually resulted  -     metFORMIN XR (Glucophage-XR) 500 mg 24 hr tablet; 1 tab twice a day for 1 week if no diarrhea then 2 tab twice a day thereafter  -     bexagliflozin (Brenzavvy) 20 mg tablet; Take 20 mg by mouth once daily.  Overweight (BMI 25.0-29.9)            Type 2 DM, uncontrolled , A1c worse.      Current regimen: He does not have insurance at this time.    Janumet 50-1000mg BID (off since more than 3 months)  Jardaince 10 mg once daily ( started 6/ 2024 ) -off since 2 weeks   Glipizide 10 mg morning and before dinner    Did not get trulicity as it was costly   Unsure why jardiance was discontinue , denies any SE was on it in past ?     Interval history  No health insurance but mentions it is in process, he should have it soon.  Continues to work  Drinking mushroom coffee.  Stopped redbull completely, only drinks sparkling mineral water       A1c was ~ 13 % changed his diet then A1c improved to 8.7 % now again 10.5 %     Checks blood sugar at home most of the blood sugar in 200s.  Denies any hypoglycemia     PLAN   Discussed alternative cheaper medications.  In place of Janumet I prescribed metformin extended release 500 mg 1 tablet twice a day with food if no diarrhea for 1 week then increase to 2 tablets twice a day with food.  Given good Rx coupons for metformin  Instead of Jardiance discussed Brenzavvy instead due to cost plus drugs.  Discussed approximately $50 per month cost    Once he has his insurance he will call me back  Diabetic diet  - increase oral hydration    Discussed SE including Uti/ yeast infections/ dizziness / EDKA   - avoid simple carbs    Check BG twice a day. Bring meter [place]   Discussed diabetic  diet and diet modifications as a means to control diabetes  LDL around goal , not on statin   Discussed DM complications      ED: In past  mentioned has struggled for couple of years   does have REUBEN   Advised to continue CPAP.   testosterone levels nml in past.    Did not discuss during this visit      RTC 4m.      SH - he is from [place] originally    he works as a    he does drink alcohol when he visits [place]    2 ange - jeri.   chompida and chespirato  No health insurance but mentions it is in process, he should have it soon.  Continues to work      FH- mom dad type 2 DM .

## 2025-02-20 NOTE — PATIENT INSTRUCTIONS
ONCE  starting metformin and brenzavvy can take glipizide 5 mg twice a day before  food . Skip glipizide if not eating

## 2025-04-15 ENCOUNTER — APPOINTMENT (OUTPATIENT)
Dept: PRIMARY CARE | Facility: CLINIC | Age: 50
End: 2025-04-15
Payer: COMMERCIAL

## 2025-04-23 ENCOUNTER — APPOINTMENT (OUTPATIENT)
Dept: ENDOCRINOLOGY | Facility: CLINIC | Age: 50
End: 2025-04-23
Payer: COMMERCIAL

## 2025-06-20 ENCOUNTER — APPOINTMENT (OUTPATIENT)
Dept: ENDOCRINOLOGY | Facility: CLINIC | Age: 50
End: 2025-06-20
Payer: COMMERCIAL

## 2025-07-07 ENCOUNTER — APPOINTMENT (OUTPATIENT)
Dept: ENDOCRINOLOGY | Facility: CLINIC | Age: 50
End: 2025-07-07
Payer: COMMERCIAL

## 2025-07-07 VITALS
BODY MASS INDEX: 24.79 KG/M2 | DIASTOLIC BLOOD PRESSURE: 76 MMHG | SYSTOLIC BLOOD PRESSURE: 106 MMHG | WEIGHT: 183 LBS | HEIGHT: 72 IN

## 2025-07-07 DIAGNOSIS — E11.00 TYPE 2 DIABETES MELLITUS WITH HYPEROSMOLARITY WITHOUT COMA, WITHOUT LONG-TERM CURRENT USE OF INSULIN (MULTI): ICD-10-CM

## 2025-07-07 DIAGNOSIS — E66.3 OVERWEIGHT (BMI 25.0-29.9): ICD-10-CM

## 2025-07-07 DIAGNOSIS — E11.9 TYPE 2 DIABETES MELLITUS WITHOUT COMPLICATION, WITHOUT LONG-TERM CURRENT USE OF INSULIN: Primary | ICD-10-CM

## 2025-07-07 LAB
POC FINGERSTICK BLOOD GLUCOSE: 355 MG/DL (ref 70–100)
POC HEMOGLOBIN A1C: 13.2 % (ref 4.2–6.5)

## 2025-07-07 PROCEDURE — 3074F SYST BP LT 130 MM HG: CPT | Performed by: HOSPITALIST

## 2025-07-07 PROCEDURE — 3078F DIAST BP <80 MM HG: CPT | Performed by: HOSPITALIST

## 2025-07-07 PROCEDURE — 3008F BODY MASS INDEX DOCD: CPT | Performed by: HOSPITALIST

## 2025-07-07 PROCEDURE — 99214 OFFICE O/P EST MOD 30 MIN: CPT | Performed by: HOSPITALIST

## 2025-07-07 PROCEDURE — 83036 HEMOGLOBIN GLYCOSYLATED A1C: CPT | Performed by: HOSPITALIST

## 2025-07-07 PROCEDURE — 82962 GLUCOSE BLOOD TEST: CPT | Performed by: HOSPITALIST

## 2025-07-07 PROCEDURE — 3046F HEMOGLOBIN A1C LEVEL >9.0%: CPT | Performed by: HOSPITALIST

## 2025-07-07 RX ORDER — GLIPIZIDE 10 MG/1
5 TABLET ORAL
Start: 2025-07-07 | End: 2026-07-07

## 2025-07-07 RX ORDER — METFORMIN HYDROCHLORIDE 500 MG/1
1000 TABLET, EXTENDED RELEASE ORAL
Start: 2025-07-07

## 2025-07-07 ASSESSMENT — ENCOUNTER SYMPTOMS
CONSTITUTIONAL NEGATIVE: 1
SORE THROAT: 0
CHEST TIGHTNESS: 0
ABDOMINAL DISTENTION: 0
ARTHRALGIAS: 0
NAUSEA: 0
TREMORS: 0
EYE ITCHING: 0
PALPITATIONS: 0
VOMITING: 0
SLEEP DISTURBANCE: 0
DYSURIA: 0
AGITATION: 0
FREQUENCY: 1
ABDOMINAL PAIN: 0
DIARRHEA: 0
SHORTNESS OF BREATH: 0
CONSTIPATION: 0
LIGHT-HEADEDNESS: 0
TROUBLE SWALLOWING: 0
PHOTOPHOBIA: 0
HEADACHES: 0
NERVOUS/ANXIOUS: 0
VOICE CHANGE: 0

## 2025-07-07 NOTE — PROGRESS NOTES
Subjective   Patient ID: D: Jose Reyes is a 49 y.o. male who presents for Diabetes ( PCP Dr Puckett, /DX DM II NO CGM/sometimes checks BS no meter today/FAM HX: Parents DM II). Only taking metformin/ recently started a new insurance  Lab Results   Component Value Date    HGBA1C 13.2 (A) 07/07/2025      HPI   See AP     Review of Systems   Constitutional: Negative.    HENT:  Negative for sore throat, trouble swallowing and voice change.    Eyes:  Negative for photophobia, itching and visual disturbance.   Respiratory:  Negative for chest tightness and shortness of breath.    Cardiovascular:  Negative for chest pain and palpitations.   Gastrointestinal:  Negative for abdominal distention, abdominal pain, constipation, diarrhea, nausea and vomiting.   Endocrine: Negative for cold intolerance, heat intolerance and polyuria.   Genitourinary:  Positive for frequency. Negative for dysuria.   Musculoskeletal:  Negative for arthralgias.   Skin:  Negative for pallor.   Allergic/Immunologic: Negative for environmental allergies.   Neurological:  Negative for tremors, light-headedness and headaches.   Psychiatric/Behavioral:  Negative for agitation and sleep disturbance. The patient is not nervous/anxious.        Objective   Physical Exam  Constitutional:       Appearance: Normal appearance.   HENT:      Head: Normocephalic.      Nose: Nose normal.      Mouth/Throat:      Mouth: Mucous membranes are moist.   Eyes:      Extraocular Movements: Extraocular movements intact.   Cardiovascular:      Rate and Rhythm: Normal rate.   Pulmonary:      Effort: Pulmonary effort is normal. No respiratory distress.   Abdominal:      General: There is no distension.   Musculoskeletal:         General: Normal range of motion.      Cervical back: Normal range of motion and neck supple.   Skin:     General: Skin is warm and dry.   Neurological:      Mental Status: He is alert and oriented to person, place, and time.   Psychiatric:         Mood and  Affect: Mood normal.    Visit Vitals  /76   Ht 1.829 m (6')   Wt 83 kg (183 lb)   BMI 24.82 kg/m²   Smoking Status Never   BSA 2.05 m²        Assessment/Plan   Diagnoses and all orders for this visit:  Type 2 diabetes mellitus without complication, without long-term current use of insulin (Multi)  -     POCT glycosylated hemoglobin (Hb A1C) manually resulted  -     POCT fingerstick glucose manually resulted  -     metFORMIN XR (Glucophage-XR) 500 mg 24 hr tablet; 1 tab twice a day for 1 week if no diarrhea then 2 tab twice a day thereafter  -     bexagliflozin (Brenzavvy) 20 mg tablet; Take 20 mg by mouth once daily.  Overweight (BMI 25.0-29.9)            Type 2 DM, uncontrolled , A1c worse.      Current regimen:    ONLY taking metformin 1g BID      Previously was on lost insurance , now has insurance again   Janumet 50-1000mg BID (off since more than 3 months)  Jardaince 10 mg once daily ( started 6/ 2024 ) -   Glipizide 10 mg morning and before dinner    Did not get trulicity as it was costly     Interval history  Works at [employer] now    Stopped redbull completely, only drinks sparkling mineral water       A1c was ~ 13 % changed his diet then A1c improved to 8.7 % now again 13.2 %     Not checking BG     PLAN   CONTINUE METFORMIN 2 TAB TWICE A DAY    RESTART GLIPIZIDE 0.5 TAB  BKFST AND 0.5 TAB WITH DINNER   START JARDIANCE 10 MG 1 TAB DAILY IN MORNING    DRINK MORE WATER   DO BLOOD WORK IN 3 WEEKS AFTER STARTING JARDIANCE    IF ANY DIZZINESS OR LIGHTHEADEDNESS STOP JARDIANCE AND CALL ME   Clinical pharmacy referral   Diabetic diet   Discussed SE of jardaince including Uti/ yeast infections/ dizziness / EDKA    Check BG twice a day. Bring meter [place]   LDL around goal , not on statin   Discussed DM complications        RTC 4m.      SH - he is from [place] originally    he works as a    he does drink alcohol when he visits [place]    2 dogs - jeri.   chompida and chespirato  New work - [employer] [place]      FH- mom dad type 2 DM .

## 2025-07-07 NOTE — PATIENT INSTRUCTIONS
CONTINUE METFORMIN 2 TAB TWICE A DAY    RESTART GLIPIZIDE 0.5 TAB  BKFST AND 0.5 TAB WITH DINNER   START JARDIANCE 10 MG 1 TAB DAILY IN MORNING    DRINK MORE WATER   DO BLOOD WORK IN 3 WEEKS AFTER STARTING JARDIANCE    IF ANY DIZZINESS OR LIGHTHEADEDNESS STOP JARDIANCE AND CALL ME

## 2025-07-10 ENCOUNTER — APPOINTMENT (OUTPATIENT)
Dept: ENDOCRINOLOGY | Facility: CLINIC | Age: 50
End: 2025-07-10
Payer: COMMERCIAL

## 2025-07-18 ENCOUNTER — TELEPHONE (OUTPATIENT)
Dept: ENDOCRINOLOGY | Facility: CLINIC | Age: 50
End: 2025-07-18
Payer: COMMERCIAL

## 2025-07-18 DIAGNOSIS — E11.00 TYPE 2 DIABETES MELLITUS WITH HYPEROSMOLARITY WITHOUT COMA, WITHOUT LONG-TERM CURRENT USE OF INSULIN (MULTI): Primary | ICD-10-CM

## 2025-07-18 RX ORDER — BLOOD SUGAR DIAGNOSTIC
STRIP MISCELLANEOUS
Qty: 200 STRIP | Refills: 1 | Status: SHIPPED | OUTPATIENT
Start: 2025-07-18

## 2025-07-18 NOTE — TELEPHONE ENCOUNTER
Pt requesting refill       One touch ultra test strips   Verified usage  90 day supply    Pharm: [id number] - [place], [place] - [address]

## 2025-08-07 ENCOUNTER — APPOINTMENT (OUTPATIENT)
Dept: PHARMACY | Facility: HOSPITAL | Age: 50
End: 2025-08-07
Payer: COMMERCIAL

## 2025-08-07 ENCOUNTER — TELEPHONE (OUTPATIENT)
Dept: PHARMACY | Facility: HOSPITAL | Age: 50
End: 2025-08-07

## 2025-08-07 NOTE — TELEPHONE ENCOUNTER
PharmD reached out over the phone at scheduled appt time however the pt could not be reached, a VM was left with PharmD contact information.

## 2025-09-10 ENCOUNTER — APPOINTMENT (OUTPATIENT)
Dept: PHARMACY | Facility: HOSPITAL | Age: 50
End: 2025-09-10
Payer: COMMERCIAL

## 2025-10-30 ENCOUNTER — APPOINTMENT (OUTPATIENT)
Dept: ENDOCRINOLOGY | Facility: CLINIC | Age: 50
End: 2025-10-30
Payer: COMMERCIAL

## 2026-02-26 ENCOUNTER — APPOINTMENT (OUTPATIENT)
Dept: ENDOCRINOLOGY | Facility: CLINIC | Age: 51
End: 2026-02-26
Payer: COMMERCIAL

## 2026-07-09 ENCOUNTER — APPOINTMENT (OUTPATIENT)
Dept: ENDOCRINOLOGY | Facility: CLINIC | Age: 51
End: 2026-07-09
Payer: COMMERCIAL